# Patient Record
Sex: FEMALE | Race: BLACK OR AFRICAN AMERICAN | NOT HISPANIC OR LATINO | Employment: FULL TIME | RURAL
[De-identification: names, ages, dates, MRNs, and addresses within clinical notes are randomized per-mention and may not be internally consistent; named-entity substitution may affect disease eponyms.]

---

## 2023-02-20 ENCOUNTER — OFFICE VISIT (OUTPATIENT)
Dept: INTERNAL MEDICINE | Facility: CLINIC | Age: 28
End: 2023-02-20
Payer: COMMERCIAL

## 2023-02-20 VITALS
RESPIRATION RATE: 18 BRPM | BODY MASS INDEX: 29.66 KG/M2 | WEIGHT: 189 LBS | HEART RATE: 95 BPM | OXYGEN SATURATION: 100 % | SYSTOLIC BLOOD PRESSURE: 112 MMHG | HEIGHT: 67 IN | TEMPERATURE: 98 F | DIASTOLIC BLOOD PRESSURE: 79 MMHG

## 2023-02-20 DIAGNOSIS — D69.6 THROMBOCYTOPENIA: ICD-10-CM

## 2023-02-20 DIAGNOSIS — L70.9 ACNE, UNSPECIFIED ACNE TYPE: ICD-10-CM

## 2023-02-20 DIAGNOSIS — Z76.89 ENCOUNTER TO ESTABLISH CARE WITH NEW DOCTOR: Primary | ICD-10-CM

## 2023-02-20 DIAGNOSIS — F41.9 ANXIETY: ICD-10-CM

## 2023-02-20 PROCEDURE — 99204 PR OFFICE/OUTPT VISIT, NEW, LEVL IV, 45-59 MIN: ICD-10-PCS | Mod: S$PBB,,, | Performed by: INTERNAL MEDICINE

## 2023-02-20 PROCEDURE — 3008F PR BODY MASS INDEX (BMI) DOCUMENTED: ICD-10-PCS | Mod: ,,, | Performed by: INTERNAL MEDICINE

## 2023-02-20 PROCEDURE — 99204 OFFICE O/P NEW MOD 45 MIN: CPT | Mod: PBBFAC | Performed by: INTERNAL MEDICINE

## 2023-02-20 PROCEDURE — 3074F SYST BP LT 130 MM HG: CPT | Mod: ,,, | Performed by: INTERNAL MEDICINE

## 2023-02-20 PROCEDURE — 1159F PR MEDICATION LIST DOCUMENTED IN MEDICAL RECORD: ICD-10-PCS | Mod: ,,, | Performed by: INTERNAL MEDICINE

## 2023-02-20 PROCEDURE — 3078F PR MOST RECENT DIASTOLIC BLOOD PRESSURE < 80 MM HG: ICD-10-PCS | Mod: ,,, | Performed by: INTERNAL MEDICINE

## 2023-02-20 PROCEDURE — 3074F PR MOST RECENT SYSTOLIC BLOOD PRESSURE < 130 MM HG: ICD-10-PCS | Mod: ,,, | Performed by: INTERNAL MEDICINE

## 2023-02-20 PROCEDURE — 99204 OFFICE O/P NEW MOD 45 MIN: CPT | Mod: S$PBB,,, | Performed by: INTERNAL MEDICINE

## 2023-02-20 PROCEDURE — 1159F MED LIST DOCD IN RCRD: CPT | Mod: ,,, | Performed by: INTERNAL MEDICINE

## 2023-02-20 PROCEDURE — 3008F BODY MASS INDEX DOCD: CPT | Mod: ,,, | Performed by: INTERNAL MEDICINE

## 2023-02-20 PROCEDURE — 3078F DIAST BP <80 MM HG: CPT | Mod: ,,, | Performed by: INTERNAL MEDICINE

## 2023-02-20 RX ORDER — SARECYCLINE HYDROCHLORIDE 100 MG/1
1 TABLET, COATED ORAL
COMMUNITY
Start: 2023-01-31 | End: 2023-04-25

## 2023-02-20 NOTE — PROGRESS NOTES
Subjective:       Patient ID: Mira Vides is a 27 y.o. female.    Chief Complaint: Establish Care (Pt concerned about platelet count that was done on recent lab work. )    The patient is a 27-year-old female the presents today to establish care.  She is a past medical history of acne.  She was treated with Accutane around 2017.  She also has plans to undergo another round of Accutane.  Recently she had lab work done on 2 separate occasions that showed low platelet count.  The 1st 1 was on January 14th that it showed [place] [address].  The 2nd 1 was on January 23rd and the platelet count was up to 118.  She is never had any issues before.  She is currently on Seysara for her skin issues.  Her blood pressure looks fantastic today it is 112/79.  She also complains of anxiety.  She would like to avoid medication.  We have discussed the importance of exercise, unplugging from technology at times, and good sleep.  She is currently resting comfortably in no distress.    Review of Systems   Constitutional:  Negative for appetite change, chills, fatigue and fever.   HENT:  Negative for nasal congestion, ear pain, hearing loss, sinus pressure/congestion and sore throat.    Eyes:  Negative for pain, redness and visual disturbance.   Respiratory:  Negative for apnea, cough, shortness of breath and wheezing.    Cardiovascular:  Negative for chest pain and palpitations.   Gastrointestinal:  Negative for abdominal pain, constipation, diarrhea and nausea.   Endocrine: Negative for cold intolerance, heat intolerance and polyuria.   Genitourinary:  Negative for dysuria and hematuria.   Musculoskeletal:  Negative for arthralgias, back pain, joint swelling, myalgias and neck pain.   Integumentary:  Negative for pallor, rash and wound.   Allergic/Immunologic: Negative for immunocompromised state.   Neurological:  Negative for tremors, seizures, weakness, headaches and memory loss.   Hematological:  Negative for  adenopathy.   Psychiatric/Behavioral:  Negative for confusion, dysphoric mood and sleep disturbance. The patient is nervous/anxious.        Objective:      Physical Exam  Vitals and nursing note reviewed.   Constitutional:       General: She is not in acute distress.     Appearance: Normal appearance. She is not ill-appearing.   HENT:      Head: Normocephalic and atraumatic.      Right Ear: External ear normal.      Left Ear: External ear normal.      Nose: Nose normal.      Mouth/Throat:      Pharynx: Oropharynx is clear.   Eyes:      Extraocular Movements: Extraocular movements intact.      Conjunctiva/sclera: Conjunctivae normal.      Pupils: Pupils are equal, round, and reactive to light.   Cardiovascular:      Rate and Rhythm: Normal rate and regular rhythm.      Pulses: Normal pulses.      Heart sounds: Normal heart sounds. No murmur heard.  Pulmonary:      Effort: No respiratory distress.      Breath sounds: Normal breath sounds. No wheezing or rales.   Abdominal:      General: Bowel sounds are normal.      Palpations: Abdomen is soft.   Musculoskeletal:         General: Normal range of motion.      Cervical back: Normal range of motion and neck supple.      Right lower leg: No edema.      Left lower leg: No edema.   Skin:     General: Skin is warm and dry.      Capillary Refill: Capillary refill takes less than 2 seconds.      Coloration: Skin is not pale.   Neurological:      General: No focal deficit present.      Mental Status: She is alert and oriented to person, place, and time.      Cranial Nerves: No cranial nerve deficit.      Sensory: No sensory deficit.      Motor: No weakness.      Gait: Gait normal.   Psychiatric:         Mood and Affect: Mood normal.         Judgment: Judgment normal.       Assessment:       Problem List Items Addressed This Visit          Psychiatric    Anxiety       Derm    Acne       Hematology    Thrombocytopenia    Relevant Orders    CBC Auto Differential    Comprehensive  Metabolic Panel    Path Review, Peripheral Smear    Vitamin B12 & Folate    Vitamin D     Other Visit Diagnoses       Encounter to establish care with new doctor    -  Primary              Plan:       1. Patient presents today to establish care.  Age-appropriate screenings are up-to-date.  Blood pressure is 112/79 on no medications.  She has been having some issues with hair breakage.  We are going to check a TSH and a free T4.  I have also ordered a B12 and vitamin-D level.    2. Thrombocytopenia-this was an incidental finding.  It was seen on 2 separate lab draws.  I am going to repeat a CBC today as the last 1 was done on January 23rd of this year.  I have also ordered a peripheral smear.  I have reviewed her medication and I do not see any known association with thrombocytopenia.  As best that I could tell the rest of the cell lines appear to be within normal limits.    3. Acne-she is currently on Seysara.  She recently had a lipid panel showed triglyceride of 42, HDL 56, LDL 82.  She is getting ready to undergo another course of Accutane.    4. Anxiety-stress.  She would like to avoid medications.  We have discussed the importance of frequent exercise, good sleep habits, unplugging from technology at times.  I have discussed with her that if these interventions do not work that we could discuss some low-dose medication to try.

## 2023-02-23 ENCOUNTER — TELEPHONE (OUTPATIENT)
Dept: INTERNAL MEDICINE | Facility: CLINIC | Age: 28
End: 2023-02-23
Payer: COMMERCIAL

## 2023-02-24 ENCOUNTER — PATIENT MESSAGE (OUTPATIENT)
Dept: INTERNAL MEDICINE | Facility: CLINIC | Age: 28
End: 2023-02-24
Payer: COMMERCIAL

## 2023-02-24 ENCOUNTER — TELEPHONE (OUTPATIENT)
Dept: INTERNAL MEDICINE | Facility: CLINIC | Age: 28
End: 2023-02-24
Payer: COMMERCIAL

## 2023-02-24 NOTE — TELEPHONE ENCOUNTER
----- Message from Theresa Brown sent at 2/24/2023  8:35 AM CST -----  Patient was returning your call. Please give her a call back.

## 2023-04-01 ENCOUNTER — OFFICE VISIT (OUTPATIENT)
Dept: FAMILY MEDICINE | Facility: CLINIC | Age: 28
End: 2023-04-01
Payer: COMMERCIAL

## 2023-04-01 VITALS
DIASTOLIC BLOOD PRESSURE: 75 MMHG | SYSTOLIC BLOOD PRESSURE: 102 MMHG | HEIGHT: 67 IN | OXYGEN SATURATION: 97 % | HEART RATE: 99 BPM | BODY MASS INDEX: 28.72 KG/M2 | TEMPERATURE: 98 F | WEIGHT: 183 LBS | RESPIRATION RATE: 18 BRPM

## 2023-04-01 DIAGNOSIS — Z32.02 PREGNANCY TEST NEGATIVE: Primary | ICD-10-CM

## 2023-04-01 DIAGNOSIS — Z34.90 PREGNANCY, UNSPECIFIED GESTATIONAL AGE: ICD-10-CM

## 2023-04-01 LAB
B-HCG UR QL: NEGATIVE
CTP QC/QA: YES

## 2023-04-01 PROCEDURE — 1159F PR MEDICATION LIST DOCUMENTED IN MEDICAL RECORD: ICD-10-PCS | Mod: ,,, | Performed by: FAMILY MEDICINE

## 2023-04-01 PROCEDURE — 81025 POCT URINE PREGNANCY: ICD-10-PCS | Mod: QW,,, | Performed by: FAMILY MEDICINE

## 2023-04-01 PROCEDURE — 3078F PR MOST RECENT DIASTOLIC BLOOD PRESSURE < 80 MM HG: ICD-10-PCS | Mod: ,,, | Performed by: FAMILY MEDICINE

## 2023-04-01 PROCEDURE — 3078F DIAST BP <80 MM HG: CPT | Mod: ,,, | Performed by: FAMILY MEDICINE

## 2023-04-01 PROCEDURE — 3008F BODY MASS INDEX DOCD: CPT | Mod: ,,, | Performed by: FAMILY MEDICINE

## 2023-04-01 PROCEDURE — 3074F SYST BP LT 130 MM HG: CPT | Mod: ,,, | Performed by: FAMILY MEDICINE

## 2023-04-01 PROCEDURE — 1160F RVW MEDS BY RX/DR IN RCRD: CPT | Mod: ,,, | Performed by: FAMILY MEDICINE

## 2023-04-01 PROCEDURE — 81025 URINE PREGNANCY TEST: CPT | Mod: QW,,, | Performed by: FAMILY MEDICINE

## 2023-04-01 PROCEDURE — 99203 OFFICE O/P NEW LOW 30 MIN: CPT | Mod: ,,, | Performed by: FAMILY MEDICINE

## 2023-04-01 PROCEDURE — 1160F PR REVIEW ALL MEDS BY PRESCRIBER/CLIN PHARMACIST DOCUMENTED: ICD-10-PCS | Mod: ,,, | Performed by: FAMILY MEDICINE

## 2023-04-01 PROCEDURE — 3074F PR MOST RECENT SYSTOLIC BLOOD PRESSURE < 130 MM HG: ICD-10-PCS | Mod: ,,, | Performed by: FAMILY MEDICINE

## 2023-04-01 PROCEDURE — 3008F PR BODY MASS INDEX (BMI) DOCUMENTED: ICD-10-PCS | Mod: ,,, | Performed by: FAMILY MEDICINE

## 2023-04-01 PROCEDURE — 99203 PR OFFICE/OUTPT VISIT, NEW, LEVL III, 30-44 MIN: ICD-10-PCS | Mod: ,,, | Performed by: FAMILY MEDICINE

## 2023-04-01 PROCEDURE — 1159F MED LIST DOCD IN RCRD: CPT | Mod: ,,, | Performed by: FAMILY MEDICINE

## 2023-04-01 NOTE — PROGRESS NOTES
Subjective:       Patient ID: Mira Vides is a 27 y.o. female.    Chief Complaint: Possible Pregnancy (Dermatologist ordered urine pregnancy test)    HPI  Review of Systems   Constitutional:  Negative for activity change, appetite change, chills, diaphoresis, fatigue, fever and unexpected weight change.   HENT:  Negative for nasal congestion, dental problem, drooling, ear discharge, ear pain, facial swelling, hearing loss, mouth sores, nosebleeds, postnasal drip, rhinorrhea, sinus pressure/congestion, sneezing, sore throat, tinnitus, trouble swallowing, voice change and goiter.    Eyes:  Negative for photophobia, discharge, itching and visual disturbance.   Respiratory:  Negative for apnea, cough, choking, chest tightness, shortness of breath, wheezing and stridor.    Cardiovascular:  Negative for chest pain, palpitations, leg swelling and claudication.   Gastrointestinal:  Negative for abdominal distention, abdominal pain, anal bleeding, blood in stool, change in bowel habit, constipation, diarrhea, nausea, vomiting and change in bowel habit.   Endocrine: Negative for cold intolerance, heat intolerance, polydipsia, polyphagia and polyuria.   Genitourinary:  Negative for bladder incontinence, decreased urine volume, difficulty urinating, dysuria, enuresis, flank pain, frequency, hematuria, nocturia, pelvic pain and urgency.   Musculoskeletal:  Negative for arthralgias, back pain, gait problem, joint swelling, leg pain, myalgias, neck pain, neck stiffness and joint deformity.   Integumentary:  Negative for pallor, rash, wound, breast mass and breast tenderness.   Allergic/Immunologic: Negative for environmental allergies, food allergies and immunocompromised state.   Neurological:  Negative for dizziness, vertigo, tremors, seizures, syncope, facial asymmetry, speech difficulty, weakness, light-headedness, numbness, headaches, coordination difficulties, memory loss and coordination difficulties.    Hematological:  Negative for adenopathy. Does not bruise/bleed easily.   Psychiatric/Behavioral:  Negative for agitation, behavioral problems, confusion, decreased concentration, dysphoric mood, hallucinations, self-injury, sleep disturbance and suicidal ideas. The patient is not nervous/anxious and is not hyperactive.    Breast: Negative for mass and tenderness      Objective:      Physical Exam  Vitals reviewed.   Constitutional:       Appearance: Normal appearance.   HENT:      Head: Normocephalic and atraumatic.      Right Ear: Tympanic membrane, ear canal and external ear normal.      Left Ear: Tympanic membrane, ear canal and external ear normal.      Nose: Nose normal.      Mouth/Throat:      Mouth: Mucous membranes are moist.      Pharynx: Oropharynx is clear.   Eyes:      Extraocular Movements: Extraocular movements intact.      Conjunctiva/sclera: Conjunctivae normal.      Pupils: Pupils are equal, round, and reactive to light.   Cardiovascular:      Rate and Rhythm: Normal rate and regular rhythm.      Pulses: Normal pulses.      Heart sounds: Normal heart sounds.   Pulmonary:      Effort: Pulmonary effort is normal.      Breath sounds: Normal breath sounds.   Abdominal:      General: Bowel sounds are normal.      Palpations: Abdomen is soft.   Musculoskeletal:         General: Normal range of motion.      Cervical back: Normal range of motion and neck supple.   Skin:     General: Skin is warm and dry.   Neurological:      General: No focal deficit present.      Mental Status: She is alert. Mental status is at baseline.   Psychiatric:         Mood and Affect: Mood normal.         Behavior: Behavior normal.         Thought Content: Thought content normal.         Judgment: Judgment normal.       Assessment:       1. Pregnancy test negative    2. Pregnancy, unspecified gestational age        Plan:     Pregnancy test negative    Pregnancy, unspecified gestational age  -     POCT urine pregnancy      Pregnancy test negative.

## 2023-04-25 ENCOUNTER — OFFICE VISIT (OUTPATIENT)
Dept: FAMILY MEDICINE | Facility: CLINIC | Age: 28
End: 2023-04-25
Payer: COMMERCIAL

## 2023-04-25 VITALS
HEART RATE: 82 BPM | RESPIRATION RATE: 18 BRPM | WEIGHT: 180 LBS | TEMPERATURE: 98 F | DIASTOLIC BLOOD PRESSURE: 75 MMHG | HEIGHT: 67 IN | SYSTOLIC BLOOD PRESSURE: 120 MMHG | BODY MASS INDEX: 28.25 KG/M2 | OXYGEN SATURATION: 98 %

## 2023-04-25 DIAGNOSIS — N89.8 VAGINAL ITCHING: ICD-10-CM

## 2023-04-25 DIAGNOSIS — N76.0 BV (BACTERIAL VAGINOSIS): ICD-10-CM

## 2023-04-25 DIAGNOSIS — B96.89 BV (BACTERIAL VAGINOSIS): ICD-10-CM

## 2023-04-25 DIAGNOSIS — R82.998 URINE WBC INCREASED: ICD-10-CM

## 2023-04-25 DIAGNOSIS — N89.8 VAGINAL DISCHARGE: Primary | ICD-10-CM

## 2023-04-25 LAB
B-HCG UR QL: NEGATIVE
BILIRUB SERPL-MCNC: NEGATIVE MG/DL
BLOOD URINE, POC: NEGATIVE
CANDIDA SPECIES: POSITIVE
COLOR, POC UA: YELLOW
CTP QC/QA: YES
GARDNERELLA: POSITIVE
GLUCOSE UR QL STRIP: NEGATIVE
KETONES UR QL STRIP: NEGATIVE
LEUKOCYTE ESTERASE URINE, POC: NORMAL
NITRITE, POC UA: NEGATIVE
PH, POC UA: 7
PROTEIN, POC: NORMAL
SPECIFIC GRAVITY, POC UA: 1.02
TRICHOMONAS: NEGATIVE
UROBILINOGEN, POC UA: 1

## 2023-04-25 PROCEDURE — 3074F PR MOST RECENT SYSTOLIC BLOOD PRESSURE < 130 MM HG: ICD-10-PCS | Mod: ,,, | Performed by: NURSE PRACTITIONER

## 2023-04-25 PROCEDURE — 3078F PR MOST RECENT DIASTOLIC BLOOD PRESSURE < 80 MM HG: ICD-10-PCS | Mod: ,,, | Performed by: NURSE PRACTITIONER

## 2023-04-25 PROCEDURE — 87591 CHLAMYDIA/GONORRHOEAE(GC), PCR: ICD-10-PCS | Mod: ,,, | Performed by: CLINICAL MEDICAL LABORATORY

## 2023-04-25 PROCEDURE — 87491 CHLAMYDIA/GONORRHOEAE(GC), PCR: ICD-10-PCS | Mod: ,,, | Performed by: CLINICAL MEDICAL LABORATORY

## 2023-04-25 PROCEDURE — 3008F BODY MASS INDEX DOCD: CPT | Mod: ,,, | Performed by: NURSE PRACTITIONER

## 2023-04-25 PROCEDURE — 81003 URINALYSIS AUTO W/O SCOPE: CPT | Mod: QW,,, | Performed by: NURSE PRACTITIONER

## 2023-04-25 PROCEDURE — 3078F DIAST BP <80 MM HG: CPT | Mod: ,,, | Performed by: NURSE PRACTITIONER

## 2023-04-25 PROCEDURE — 87591 N.GONORRHOEAE DNA AMP PROB: CPT | Mod: ,,, | Performed by: CLINICAL MEDICAL LABORATORY

## 2023-04-25 PROCEDURE — 87491 CHLMYD TRACH DNA AMP PROBE: CPT | Mod: ,,, | Performed by: CLINICAL MEDICAL LABORATORY

## 2023-04-25 PROCEDURE — 99051 PR MEDICAL SERVICES, EVE/WKEND/HOLIDAY: ICD-10-PCS | Mod: ,,, | Performed by: NURSE PRACTITIONER

## 2023-04-25 PROCEDURE — 87480 BACTERIAL VAGINOSIS: ICD-10-PCS | Mod: ,,, | Performed by: CLINICAL MEDICAL LABORATORY

## 2023-04-25 PROCEDURE — 87510 BACTERIAL VAGINOSIS: ICD-10-PCS | Mod: ,,, | Performed by: CLINICAL MEDICAL LABORATORY

## 2023-04-25 PROCEDURE — 81025 URINE PREGNANCY TEST: CPT | Mod: QW,,, | Performed by: NURSE PRACTITIONER

## 2023-04-25 PROCEDURE — 3008F PR BODY MASS INDEX (BMI) DOCUMENTED: ICD-10-PCS | Mod: ,,, | Performed by: NURSE PRACTITIONER

## 2023-04-25 PROCEDURE — 99213 OFFICE O/P EST LOW 20 MIN: CPT | Mod: ,,, | Performed by: NURSE PRACTITIONER

## 2023-04-25 PROCEDURE — 87660 BACTERIAL VAGINOSIS: ICD-10-PCS | Mod: ,,, | Performed by: CLINICAL MEDICAL LABORATORY

## 2023-04-25 PROCEDURE — 81003 POCT URINALYSIS W/O SCOPE: ICD-10-PCS | Mod: QW,,, | Performed by: NURSE PRACTITIONER

## 2023-04-25 PROCEDURE — 1159F MED LIST DOCD IN RCRD: CPT | Mod: ,,, | Performed by: NURSE PRACTITIONER

## 2023-04-25 PROCEDURE — 1159F PR MEDICATION LIST DOCUMENTED IN MEDICAL RECORD: ICD-10-PCS | Mod: ,,, | Performed by: NURSE PRACTITIONER

## 2023-04-25 PROCEDURE — 87660 TRICHOMONAS VAGIN DIR PROBE: CPT | Mod: ,,, | Performed by: CLINICAL MEDICAL LABORATORY

## 2023-04-25 PROCEDURE — 81025 POCT URINE PREGNANCY: ICD-10-PCS | Mod: QW,,, | Performed by: NURSE PRACTITIONER

## 2023-04-25 PROCEDURE — 3074F SYST BP LT 130 MM HG: CPT | Mod: ,,, | Performed by: NURSE PRACTITIONER

## 2023-04-25 PROCEDURE — 87510 GARDNER VAG DNA DIR PROBE: CPT | Mod: ,,, | Performed by: CLINICAL MEDICAL LABORATORY

## 2023-04-25 PROCEDURE — 87480 CANDIDA DNA DIR PROBE: CPT | Mod: ,,, | Performed by: CLINICAL MEDICAL LABORATORY

## 2023-04-25 PROCEDURE — 99213 PR OFFICE/OUTPT VISIT, EST, LEVL III, 20-29 MIN: ICD-10-PCS | Mod: ,,, | Performed by: NURSE PRACTITIONER

## 2023-04-25 PROCEDURE — 99051 MED SERV EVE/WKEND/HOLIDAY: CPT | Mod: ,,, | Performed by: NURSE PRACTITIONER

## 2023-04-25 RX ORDER — FLUCONAZOLE 150 MG/1
150 TABLET ORAL WEEKLY
Qty: 2 TABLET | Refills: 0 | Status: SHIPPED | OUTPATIENT
Start: 2023-04-25 | End: 2023-04-26

## 2023-04-26 LAB
CHLAMYDIA BY PCR: NEGATIVE
N. GONORRHOEAE (GC) BY PCR: NEGATIVE

## 2023-04-26 RX ORDER — METRONIDAZOLE 500 MG/1
500 TABLET ORAL EVERY 12 HOURS
Qty: 14 TABLET | Refills: 0 | Status: SHIPPED | OUTPATIENT
Start: 2023-04-26 | End: 2023-05-03

## 2023-04-26 NOTE — PROGRESS NOTES
Subjective:       Patient ID: Mira Vides is a 27 y.o. female.    Chief Complaint: Urinary Frequency and Vaginal Itching    Urinary Frequency and Vaginal Itching      Urinary Frequency   Associated symptoms include frequency. Pertinent negatives include no chills, flank pain or urgency.   Vaginal Itching  The patient's primary symptoms include vaginal discharge. Associated symptoms include frequency. Pertinent negatives include no chills, dysuria, fever, flank pain or urgency.   Review of Systems   Constitutional:  Negative for chills, fatigue and fever.   Genitourinary:  Positive for frequency and vaginal discharge. Negative for dysuria, flank pain, genital sores and urgency.       Objective:      Physical Exam  Constitutional:       General: She is not in acute distress.     Appearance: Normal appearance. She is not ill-appearing, toxic-appearing or diaphoretic.   Eyes:      Pupils: Pupils are equal, round, and reactive to light.   Cardiovascular:      Rate and Rhythm: Normal rate and regular rhythm.      Pulses: Normal pulses.      Heart sounds: Normal heart sounds.   Pulmonary:      Effort: Pulmonary effort is normal.      Breath sounds: Normal breath sounds.   Abdominal:      General: Bowel sounds are normal.      Palpations: Abdomen is soft.      Tenderness: There is no abdominal tenderness. There is no right CVA tenderness, left CVA tenderness, guarding or rebound.   Skin:     General: Skin is warm and dry.   Neurological:      Mental Status: She is alert and oriented to person, place, and time.   Psychiatric:         Mood and Affect: Mood normal.         Behavior: Behavior normal.          Assessment:       1. Vaginal discharge    2. Vaginal itching    3. Urine WBC increased    4. BV (bacterial vaginosis)        Plan:   Vaginal discharge  -     Chlamydia/GC, PCR; Future; Expected date: 04/25/2023  -     POCT urine pregnancy  -     Bacterial Vaginosis; Future; Expected date: 04/25/2023  -     POCT  URINALYSIS W/O SCOPE    Vaginal itching  -     fluconazole (DIFLUCAN) 150 MG Tab; Take 1 tablet (150 mg total) by mouth once a week. for 1 day  Dispense: 2 tablet; Refill: 0    Urine WBC increased    BV (bacterial vaginosis)  -     metroNIDAZOLE (FLAGYL) 500 MG tablet; Take 1 tablet (500 mg total) by mouth every 12 (twelve) hours. for 14 doses  Dispense: 14 tablet; Refill: 0         Risks, benefits, and side effects were discussed with the patient. All questions were answered to the fullest satisfaction of the patient, and pt verbalized understanding and agreement to treatment plan. Pt was to call with any new or worsening symptoms, or present to the ER

## 2023-07-11 ENCOUNTER — PATIENT MESSAGE (OUTPATIENT)
Dept: RESEARCH | Facility: HOSPITAL | Age: 28
End: 2023-07-11